# Patient Record
Sex: FEMALE | Race: BLACK OR AFRICAN AMERICAN | NOT HISPANIC OR LATINO | Employment: OTHER | URBAN - METROPOLITAN AREA
[De-identification: names, ages, dates, MRNs, and addresses within clinical notes are randomized per-mention and may not be internally consistent; named-entity substitution may affect disease eponyms.]

---

## 2022-02-14 ENCOUNTER — TELEPHONE (OUTPATIENT)
Dept: FAMILY MEDICINE | Facility: CLINIC | Age: 82
End: 2022-02-14
Payer: MEDICARE

## 2022-02-14 NOTE — TELEPHONE ENCOUNTER
----- Message from Eva Barber sent at 2/14/2022  9:13 AM CST -----  dr mathews needs pt to be called for appt..[phone] (home)

## 2022-02-14 NOTE — TELEPHONE ENCOUNTER
----- Message from Lisa Albright MA sent at 2/11/2022  2:10 PM CST -----    ----- Message -----  From: Eva Barber  Sent: 2/11/2022   2:08 PM CST  To: Christie Ibrahim Staff    pt needs call to schedule ..[phone] or [phone]

## 2022-02-17 ENCOUNTER — OFFICE VISIT (OUTPATIENT)
Dept: FAMILY MEDICINE | Facility: CLINIC | Age: 82
End: 2022-02-17
Payer: MEDICARE

## 2022-02-17 VITALS
SYSTOLIC BLOOD PRESSURE: 160 MMHG | OXYGEN SATURATION: 97 % | RESPIRATION RATE: 16 BRPM | HEART RATE: 63 BPM | DIASTOLIC BLOOD PRESSURE: 72 MMHG

## 2022-02-17 DIAGNOSIS — S70.911D: Primary | ICD-10-CM

## 2022-02-17 DIAGNOSIS — L08.9: Primary | ICD-10-CM

## 2022-02-17 DIAGNOSIS — Z79.2 LONG TERM (CURRENT) USE OF ANTIBIOTICS: ICD-10-CM

## 2022-02-17 PROCEDURE — 99213 OFFICE O/P EST LOW 20 MIN: CPT | Mod: S$GLB,,, | Performed by: INTERNAL MEDICINE

## 2022-02-17 PROCEDURE — 1160F RVW MEDS BY RX/DR IN RCRD: CPT | Mod: CPTII,S$GLB,, | Performed by: INTERNAL MEDICINE

## 2022-02-17 PROCEDURE — 3078F PR MOST RECENT DIASTOLIC BLOOD PRESSURE < 80 MM HG: ICD-10-PCS | Mod: CPTII,S$GLB,, | Performed by: INTERNAL MEDICINE

## 2022-02-17 PROCEDURE — 3078F DIAST BP <80 MM HG: CPT | Mod: CPTII,S$GLB,, | Performed by: INTERNAL MEDICINE

## 2022-02-17 PROCEDURE — 1159F MED LIST DOCD IN RCRD: CPT | Mod: CPTII,S$GLB,, | Performed by: INTERNAL MEDICINE

## 2022-02-17 PROCEDURE — 1160F PR REVIEW ALL MEDS BY PRESCRIBER/CLIN PHARMACIST DOCUMENTED: ICD-10-PCS | Mod: CPTII,S$GLB,, | Performed by: INTERNAL MEDICINE

## 2022-02-17 PROCEDURE — 3077F SYST BP >= 140 MM HG: CPT | Mod: CPTII,S$GLB,, | Performed by: INTERNAL MEDICINE

## 2022-02-17 PROCEDURE — 99213 PR OFFICE/OUTPT VISIT, EST, LEVL III, 20-29 MIN: ICD-10-PCS | Mod: S$GLB,,, | Performed by: INTERNAL MEDICINE

## 2022-02-17 PROCEDURE — 3077F PR MOST RECENT SYSTOLIC BLOOD PRESSURE >= 140 MM HG: ICD-10-PCS | Mod: CPTII,S$GLB,, | Performed by: INTERNAL MEDICINE

## 2022-02-17 PROCEDURE — 1159F PR MEDICATION LIST DOCUMENTED IN MEDICAL RECORD: ICD-10-PCS | Mod: CPTII,S$GLB,, | Performed by: INTERNAL MEDICINE

## 2022-02-17 RX ORDER — ATENOLOL 25 MG/1
25 TABLET ORAL
COMMUNITY

## 2022-02-17 RX ORDER — NAPROXEN SODIUM 220 MG/1
81 TABLET, FILM COATED ORAL
COMMUNITY

## 2022-02-17 NOTE — ASSESSMENT & PLAN NOTE
Doing well at this time, pain controlled.  Will continue current antibiotics.  Will get lab results from last week and request weekly labs be faxed here to [facility].  Will have her follow up in 3 weeks.  Continue orthopedics follow up as well.

## 2022-02-17 NOTE — PROGRESS NOTES
Subjective:       Patient ID: Henrietta Aguirre is a 81 y.o. female.    Chief Complaint: Follow-up (SEROMA OF HIP)    Patient is here for follow up on infected seroma of the right hip.  She reports she is doing well.  Her sister is with her.  She denies any pain from the hip currently.  Reports that drain was removed at orthopedic office today.  Drain output had been 40-50mL over each 12 hour period, was higher today.  Has a bandage in place.  No fever or chills.  PICC line is in the RUE, no issues with it.  Pending initiation of hyperbaric therapy.  Had labwork done earlier this week, we do not have results available at this time.  No other issues.    Review of Systems   Constitutional: Negative for chills and fever.   HENT: Negative for sore throat.    Respiratory: Negative for cough and shortness of breath.    Cardiovascular: Negative for chest pain and palpitations.   Gastrointestinal: Negative for diarrhea, nausea and vomiting.   Skin: Negative for rash and wound.       Objective:      Physical Exam  Vitals and nursing note reviewed.   Constitutional:       Appearance: She is well-developed and well-nourished.   Cardiovascular:      Rate and Rhythm: Normal rate and regular rhythm.      Heart sounds: No murmur heard.      Pulmonary:      Effort: Pulmonary effort is normal. No respiratory distress.      Breath sounds: Normal breath sounds. No wheezing.   Abdominal:      General: Bowel sounds are normal.      Palpations: Abdomen is soft.      Tenderness: There is no abdominal tenderness.   Skin:     General: Skin is warm and dry.      Findings: No rash.         Assessment:       1. Injury of hip, superficial, infected, right, subsequent encounter    2. Long term (current) use of antibiotics        Plan:       Problem List Items Addressed This Visit        ID    Long term (current) use of antibiotics     Tolerating well, continue to monitor.            Other    Injury of hip, superficial, infected, right, subsequent  encounter - Primary     Doing well at this time, pain controlled.  Will continue current antibiotics.  Will get lab results from last week and request weekly labs be faxed here to [facility].  Will have her follow up in 3 weeks.  Continue orthopedics follow up as well.

## 2022-03-10 ENCOUNTER — OFFICE VISIT (OUTPATIENT)
Dept: INFECTIOUS DISEASES | Facility: CLINIC | Age: 82
End: 2022-03-10
Payer: MEDICARE

## 2022-03-10 VITALS
DIASTOLIC BLOOD PRESSURE: 71 MMHG | HEART RATE: 80 BPM | SYSTOLIC BLOOD PRESSURE: 121 MMHG | OXYGEN SATURATION: 98 % | RESPIRATION RATE: 16 BRPM

## 2022-03-10 DIAGNOSIS — Z71.2 ENCOUNTER TO DISCUSS TEST RESULTS: ICD-10-CM

## 2022-03-10 DIAGNOSIS — S70.911D: Primary | ICD-10-CM

## 2022-03-10 DIAGNOSIS — Z95.828 STATUS POST PERIPHERALLY INSERTED CENTRAL CATHETER (PICC) CENTRAL LINE PLACEMENT: ICD-10-CM

## 2022-03-10 DIAGNOSIS — L08.9: Primary | ICD-10-CM

## 2022-03-10 PROCEDURE — 3074F PR MOST RECENT SYSTOLIC BLOOD PRESSURE < 130 MM HG: ICD-10-PCS | Mod: CPTII,S$GLB,, | Performed by: NURSE PRACTITIONER

## 2022-03-10 PROCEDURE — 3078F PR MOST RECENT DIASTOLIC BLOOD PRESSURE < 80 MM HG: ICD-10-PCS | Mod: CPTII,S$GLB,, | Performed by: NURSE PRACTITIONER

## 2022-03-10 PROCEDURE — 3074F SYST BP LT 130 MM HG: CPT | Mod: CPTII,S$GLB,, | Performed by: NURSE PRACTITIONER

## 2022-03-10 PROCEDURE — 99214 PR OFFICE/OUTPT VISIT, EST, LEVL IV, 30-39 MIN: ICD-10-PCS | Mod: S$GLB,,, | Performed by: NURSE PRACTITIONER

## 2022-03-10 PROCEDURE — 1159F PR MEDICATION LIST DOCUMENTED IN MEDICAL RECORD: ICD-10-PCS | Mod: CPTII,S$GLB,, | Performed by: NURSE PRACTITIONER

## 2022-03-10 PROCEDURE — 3078F DIAST BP <80 MM HG: CPT | Mod: CPTII,S$GLB,, | Performed by: NURSE PRACTITIONER

## 2022-03-10 PROCEDURE — 1159F MED LIST DOCD IN RCRD: CPT | Mod: CPTII,S$GLB,, | Performed by: NURSE PRACTITIONER

## 2022-03-10 PROCEDURE — 99214 OFFICE O/P EST MOD 30 MIN: CPT | Mod: S$GLB,,, | Performed by: NURSE PRACTITIONER

## 2022-03-10 NOTE — PROGRESS NOTES
Infectious Diseases Clinic Note    Subjective:       Patient ID: Henrietta Aguirre is a 81 y.o. female     Chief Complaint: Follow-up (3 week FU Injury of hip, infected, right) and Results        Henrietta Aguirre is a 81 y.o. female here today for consultation regarding hip infection.  This is a new diagnosis to me.   Primary care provider is Carmen Villalpando MD .     Ms. Aguirre is a 81 y.o. female with a history of mycosis fungoides, initially stage IB, which was diagnosed in 2003. When she initially presented, she had a 3-year history of a hypopigmented rash on the trunk, thighs, and arms, and had been on hydrochlorothiazide for 7 years prior to the diagnosis.     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 Marvin Ledesma's office on 2/3/2022 for right hip washout.     Patient is here for follow up on infected seroma of the right hip.  She reports she is doing well.  Her sister is with her.  She denies any pain from the hip currently.  No fever or chills.  PICC line is in the RUE, no issues with it. She is tolerating Ceftriaxone well. Last dose is scheduled for march 17th.  Pending initiation of hyperbaric therapy.  Had labwork done earlier this week. ESR is trending downward and is almost at goal.   No other issues.                   Past Medical History:   Diagnosis Date    Bronchitis     Citrobacter infection     HTN (hypertension)     Mycosis fungoides        Social History     Socioeconomic History    Marital status:    Tobacco Use    Smoking status: Never Smoker    Smokeless tobacco: Never Used   Substance and Sexual Activity    Alcohol use: Never    Drug use: Never         Current Outpatient Medications:     aspirin 81 MG Chew, Take 81 mg by  mouth., Disp: , Rfl:     atenoloL (TENORMIN) 25 MG tablet, Take 25 mg by mouth., Disp: , Rfl:     Review of Systems   Constitutional: Negative for chills and fever.   HENT: Negative for sore throat.    Respiratory: Negative for cough and shortness of breath.    Cardiovascular: Negative for chest pain and palpitations.   Gastrointestinal: Negative for diarrhea, nausea and vomiting.   Skin: Negative for rash and wound.           Objective:      Vitals:    03/10/22 1332   BP: 121/71   Pulse: 80   Resp: 16     Physical Exam  Vitals and nursing note reviewed.   Constitutional:       Appearance: She is well-developed.   Cardiovascular:      Rate and Rhythm: Normal rate and regular rhythm.      Heart sounds: No murmur heard.  Pulmonary:      Effort: Pulmonary effort is normal. No respiratory distress.      Breath sounds: Normal breath sounds. No wheezing.   Abdominal:      General: Bowel sounds are normal.      Palpations: Abdomen is soft.      Tenderness: There is no abdominal tenderness.   Musculoskeletal:        Arms:    Skin:     General: Skin is warm and dry.      Findings: No rash.   Neurological:      Mental Status: She is oriented to person, place, and time. Mental status is at baseline.   Psychiatric:         Mood and Affect: Mood normal.         Behavior: Behavior normal.         Thought Content: Thought content normal.         Judgment: Judgment normal.             Assessment/Plan:       1. Injury of hip, superficial, infected, right, subsequent encounter    2. Status post peripherally inserted central catheter (PICC) central line placement    3. Encounter to discuss test results      Problem List Items Addressed This Visit        Other    Injury of hip, superficial, infected, right, subsequent encounter - Primary    Current Assessment & Plan     Is doing well at this time.  She is scheduled to see Dr. Ledesma next week. Wound is doing really well at this point. She is tolerating Abx.     Labs reviewed w/ patient.      2/28/22   ESR 47     3/8/22  ESR 35       PLAN     1. Continue ABX as planned. Last dose is 3/17.   2. Scheduled to have labs next Monday. Anticipate ESR to be at goal. We will discuss this at her follow up next week.              Other Visit Diagnoses     Status post peripherally inserted central catheter (PICC) central line placement        site doing well.     Encounter to discuss test results             No visits with results within 1 Month(s) from this visit.   Latest known visit with results is:   No results found for any previous visit.      No results found in the last 30 days.      Duration of encounter: 20 minutes  This includes face-to-face time and non face-to-face time preparing to see the patient (eg, review of tests), obtaining and/or reviewing separately obtained history, documenting clinical information in the electronic or other health record, independently interpreting resultsand communicating results to the patient/family/caregiver, or care coordination.      All diagnostic data (labs/imaging) was reviewed with the patient and/or family member in the room.  All questions were answered to their liking. The patient and/or family member voiced understanding of all instructions provided. Expectations regarding follow up and treatment plan were voiced and confirmed prior to departure. The patient was given orders/instructions at the end of the visit for reference. They were instructed to notify my office if they have not been contacted for imaging/referrals/labs/results in 1-2 weeks. They voiced understanding of all of the above.     Follow up:     There are no Patient Instructions on file for this visit.     Follow up in about 1 week (around 3/17/2022), or if symptoms worsen or fail to improve.

## 2022-03-10 NOTE — ASSESSMENT & PLAN NOTE
Is doing well at this time.  She is scheduled to see Dr. Ledesma next week. Wound is doing really well at this point. She is tolerating Abx.     Labs reviewed w/ patient.     2/28/22   ESR 47     3/8/22  ESR 35       PLAN     1. Continue ABX as planned. Last dose is 3/17.   2. Scheduled to have labs next Monday. Anticipate ESR to be at goal. We will discuss this at her follow up next week.

## 2022-03-16 ENCOUNTER — TELEPHONE (OUTPATIENT)
Dept: FAMILY MEDICINE | Facility: CLINIC | Age: 82
End: 2022-03-16
Payer: MEDICARE

## 2022-03-16 NOTE — TELEPHONE ENCOUNTER
Patrice Soriano MD   Sent: Mon March 14, 2022 10:22 AM   To: Lisa Albright MA            Message    Labs look good, complete meds as scheduled.

## 2022-03-17 ENCOUNTER — OFFICE VISIT (OUTPATIENT)
Dept: INFECTIOUS DISEASES | Facility: CLINIC | Age: 82
End: 2022-03-17
Payer: MEDICARE

## 2022-03-17 ENCOUNTER — TELEPHONE (OUTPATIENT)
Dept: FAMILY MEDICINE | Facility: CLINIC | Age: 82
End: 2022-03-17
Payer: MEDICARE

## 2022-03-17 DIAGNOSIS — L08.9: ICD-10-CM

## 2022-03-17 DIAGNOSIS — S70.911D: ICD-10-CM

## 2022-03-17 PROCEDURE — 1159F PR MEDICATION LIST DOCUMENTED IN MEDICAL RECORD: ICD-10-PCS | Mod: CPTII,S$GLB,, | Performed by: NURSE PRACTITIONER

## 2022-03-17 PROCEDURE — 99213 OFFICE O/P EST LOW 20 MIN: CPT | Mod: S$GLB,,, | Performed by: NURSE PRACTITIONER

## 2022-03-17 PROCEDURE — 1159F MED LIST DOCD IN RCRD: CPT | Mod: CPTII,S$GLB,, | Performed by: NURSE PRACTITIONER

## 2022-03-17 PROCEDURE — 99213 PR OFFICE/OUTPT VISIT, EST, LEVL III, 20-29 MIN: ICD-10-PCS | Mod: S$GLB,,, | Performed by: NURSE PRACTITIONER

## 2022-03-17 NOTE — PROGRESS NOTES
Infectious Diseases Clinic Note    Subjective:       Patient ID: Henrietta Aguirre is a 81 y.o. female     Chief Complaint: Follow-up (PICC REMOVAL)        Henrietta Aguirre is a 81 y.o. female here today for consultation regarding hip infection.   Primary care provider is Carmen Villalpando MD .     Ms. Aguirre is a 81 y.o. female with a history of mycosis fungoides, initially stage IB, which was diagnosed in 2003. When she initially presented, she had a 3-year history of a hypopigmented rash on the trunk, thighs, and arms, and had been on hydrochlorothiazide for 7 years prior to the diagnosis.     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 Marvin Ledesma's office on 2/3/2022 for right hip washout.     Patient is here for 2 week follow up on infected seroma of the right hip.  She reports she is doing well.  Her sister is with her.  She denies any pain from the hip currently. Wound is healing well.  No fever or chills.  PICC line was removed this AM by [facility]. Last dose of ceftriaxone was this AM.   She is currently doing hyperbaric therapy.  Had labwork done earlier this week. ESR is trending downward and at goal of < 30. She still has sutures in place, which will be removed on Tuesday by Dr. Ledesma.   No other issues.                   Past Medical History:   Diagnosis Date    Bronchitis     Citrobacter infection     HTN (hypertension)     Mycosis fungoides        Social History     Socioeconomic History    Marital status:    Tobacco Use    Smoking status: Never Smoker    Smokeless tobacco: Never Used   Substance and Sexual Activity    Alcohol use: Never    Drug use: Never         Current Outpatient Medications:     aspirin 81 MG Chew, Take 81 mg by  mouth., Disp: , Rfl:     atenoloL (TENORMIN) 25 MG tablet, Take 25 mg by mouth., Disp: , Rfl:     Review of Systems   Constitutional: Negative for chills and fever.   HENT: Negative for sore throat.    Respiratory: Negative for cough and shortness of breath.    Cardiovascular: Negative for chest pain and palpitations.   Gastrointestinal: Negative for diarrhea, nausea and vomiting.   Skin: Negative for rash and wound.           Objective:      There were no vitals filed for this visit.  Physical Exam  Vitals and nursing note reviewed.   Constitutional:       General: She is not in acute distress.     Appearance: Normal appearance. She is well-developed. She is not ill-appearing.   Cardiovascular:      Rate and Rhythm: Normal rate and regular rhythm.      Heart sounds: No murmur heard.  Pulmonary:      Effort: Pulmonary effort is normal. No respiratory distress.      Breath sounds: Normal breath sounds. No wheezing.   Abdominal:      General: Bowel sounds are normal.      Palpations: Abdomen is soft.      Tenderness: There is no abdominal tenderness.   Skin:     General: Skin is warm and dry.      Findings: No rash.   Neurological:      Mental Status: She is alert and oriented to person, place, and time. Mental status is at baseline.   Psychiatric:         Mood and Affect: Mood normal.         Behavior: Behavior normal.         Thought Content: Thought content normal.         Judgment: Judgment normal.             Assessment/Plan:       1. Injury of hip, superficial, infected, right, subsequent encounter      Problem List Items Addressed This Visit        Other    Injury of hip, superficial, infected, right, subsequent encounter    Current Assessment & Plan        Is doing well at this time.  PICC line was removed by [facility] prior to arrival.      Labs reviewed w/ patient.      2/28/22   ESR 47      3/8/22  ESR 35     3/17/22  ESR 27         PLAN      ESR is at goal currently. She has completed 6 weeks of IV  ceftriaxone as planned. F/u PRN. No oral abx needed. F/u with Dr. Ledesma next week for suture removal.                      No visits with results within 1 Month(s) from this visit.   Latest known visit with results is:   No results found for any previous visit.      No results found in the last 30 days.      Duration of encounter: 15 minutes  This includes face-to-face time and non face-to-face time preparing to see the patient (eg, review of tests), obtaining and/or reviewing separately obtained history, documenting clinical information in the electronic or other health record, independently interpreting resultsand communicating results to the patient/family/caregiver, or care coordination.      All diagnostic data (labs/imaging) was reviewed with the patient and/or family member in the room.  All questions were answered to their liking. The patient and/or family member voiced understanding of all instructions provided. Expectations regarding follow up and treatment plan were voiced and confirmed prior to departure. The patient was given orders/instructions at the end of the visit for reference. They were instructed to notify my office if they have not been contacted for imaging/referrals/labs/results in 1-2 weeks. They voiced understanding of all of the above.     Follow up:     There are no Patient Instructions on file for this visit.     Follow up if symptoms worsen or fail to improve.

## 2022-03-17 NOTE — ASSESSMENT & PLAN NOTE
Is doing well at this time.  PICC line was removed by [facility] prior to arrival.      Labs reviewed w/ patient.      2/28/22   ESR 47      3/8/22  ESR 35     3/17/22  ESR 27         PLAN      ESR is at goal currently. She has completed 6 weeks of IV ceftriaxone as planned. F/u PRN. No oral abx needed. F/u with Dr. Ledesma next week for suture removal.

## 2022-03-17 NOTE — TELEPHONE ENCOUNTER
Esr = 27 per Rossana chen/ [facility].        Chase Landeros NP   Sent: Thu March 17, 2022 12:17 PM                Message    There is NO ESR on this report... need it asap

## 2023-04-25 ENCOUNTER — HOSPITAL ENCOUNTER (EMERGENCY)
Facility: HOSPITAL | Age: 83
Discharge: HOME | End: 2023-04-25
Payer: MEDICARE

## 2023-04-25 VITALS
WEIGHT: 180 LBS | OXYGEN SATURATION: 99 % | TEMPERATURE: 99 F | BODY MASS INDEX: 33.99 KG/M2 | RESPIRATION RATE: 15 BRPM | SYSTOLIC BLOOD PRESSURE: 180 MMHG | HEIGHT: 61 IN | HEART RATE: 71 BPM | DIASTOLIC BLOOD PRESSURE: 79 MMHG

## 2023-04-25 DIAGNOSIS — R42 LIGHTHEADEDNESS: ICD-10-CM

## 2023-04-25 DIAGNOSIS — R11.0 NAUSEA: Primary | ICD-10-CM

## 2023-04-25 LAB
ALBUMIN SERPL-MCNC: 4.7 G/DL (ref 3.4–5)
ALBUMIN/GLOB SERPL: 1.1 RATIO
ALP SERPL-CCNC: 120 UNIT/L (ref 50–144)
ALT SERPL-CCNC: 22 UNIT/L (ref 1–45)
ANION GAP SERPL CALC-SCNC: 7 MEQ/L (ref 2–13)
AST SERPL-CCNC: 31 UNIT/L (ref 14–36)
BASOPHILS # BLD AUTO: 0.07 X10(3)/MCL (ref 0.01–0.08)
BASOPHILS NFR BLD AUTO: 1.4 % (ref 0.1–1.2)
BILIRUBIN DIRECT+TOT PNL SERPL-MCNC: 1 MG/DL (ref 0–1)
BUN SERPL-MCNC: 22 MG/DL (ref 7–20)
CALCIUM SERPL-MCNC: 10.7 MG/DL (ref 8.4–10.2)
CHLORIDE SERPL-SCNC: 102 MMOL/L (ref 98–110)
CO2 SERPL-SCNC: 28 MMOL/L (ref 21–32)
CREAT SERPL-MCNC: 0.53 MG/DL (ref 0.66–1.25)
CREAT/UREA NIT SERPL: 42 (ref 12–20)
EOSINOPHIL # BLD AUTO: 0.12 X10(3)/MCL (ref 0.04–0.36)
EOSINOPHIL NFR BLD AUTO: 2.3 % (ref 0.7–7)
ERYTHROCYTE [DISTWIDTH] IN BLOOD BY AUTOMATED COUNT: 15.2 % (ref 11–14.5)
GFR SERPLBLD CREATININE-BSD FMLA CKD-EPI: >90 MLS/MIN/1.73/M2
GLOBULIN SER-MCNC: 4.4 GM/DL (ref 2–3.9)
GLUCOSE SERPL-MCNC: 142 MG/DL (ref 70–115)
HCT VFR BLD AUTO: 50 % (ref 36–48)
HGB BLD-MCNC: 16.3 G/DL (ref 11.8–16)
IMM GRANULOCYTES # BLD AUTO: 0.01 X10(3)/MCL (ref 0–0.03)
IMM GRANULOCYTES NFR BLD AUTO: 0.2 % (ref 0–0.5)
LIPASE SERPL-CCNC: <20 U/L (ref 23–300)
LYMPHOCYTES # BLD AUTO: 1.6 X10(3)/MCL (ref 1.16–3.74)
LYMPHOCYTES NFR BLD AUTO: 30.9 % (ref 20–55)
MCH RBC QN AUTO: 27.5 PG (ref 27–34)
MCV RBC AUTO: 84.3 FL (ref 79–99)
MEAN CELL HEMOGLOBIN CONCENTRATION (OHS) G/DL: 32.6 G/DL (ref 31–37)
MONOCYTES # BLD AUTO: 0.35 X10(3)/MCL (ref 0.24–0.36)
MONOCYTES NFR BLD AUTO: 6.8 % (ref 4.7–12.5)
NEUTROPHILS # BLD AUTO: 3.02 X10(3)/MCL (ref 1.56–6.13)
NEUTROPHILS NFR BLD AUTO: 58.4 % (ref 37–73)
NRBC BLD AUTO-RTO: 0 % (ref 0–1)
PLATELET # BLD AUTO: 169 X10(3)/MCL (ref 140–371)
PMV BLD AUTO: 10.9 FL (ref 9.4–12.4)
POCT GLUCOSE: 127 MG/DL (ref 70–110)
POTASSIUM SERPL-SCNC: 3.7 MMOL/L (ref 3.5–5.1)
PROT SERPL-MCNC: 9.1 GM/DL (ref 6.3–8.2)
RBC # BLD AUTO: 5.93 X10(6)/MCL (ref 4–5.1)
SODIUM SERPL-SCNC: 137 MMOL/L (ref 135–145)
TROPONIN I SERPL-MCNC: 0.01 NG/ML (ref 0–0.03)
WBC # SPEC AUTO: 5.2 X10(3)/MCL (ref 4–11.5)

## 2023-04-25 PROCEDURE — 93010 ELECTROCARDIOGRAM REPORT: CPT | Mod: ,,, | Performed by: INTERNAL MEDICINE

## 2023-04-25 PROCEDURE — 25000003 PHARM REV CODE 250

## 2023-04-25 PROCEDURE — 82962 GLUCOSE BLOOD TEST: CPT

## 2023-04-25 PROCEDURE — 96372 THER/PROPH/DIAG INJ SC/IM: CPT | Mod: 59

## 2023-04-25 PROCEDURE — 85025 COMPLETE CBC W/AUTO DIFF WBC: CPT

## 2023-04-25 PROCEDURE — 84484 ASSAY OF TROPONIN QUANT: CPT

## 2023-04-25 PROCEDURE — 99284 EMERGENCY DEPT VISIT MOD MDM: CPT | Mod: 25

## 2023-04-25 PROCEDURE — 93010 EKG 12-LEAD: ICD-10-PCS | Mod: ,,, | Performed by: INTERNAL MEDICINE

## 2023-04-25 PROCEDURE — 80053 COMPREHEN METABOLIC PANEL: CPT

## 2023-04-25 PROCEDURE — 83690 ASSAY OF LIPASE: CPT

## 2023-04-25 PROCEDURE — 96375 TX/PRO/DX INJ NEW DRUG ADDON: CPT

## 2023-04-25 PROCEDURE — 93005 ELECTROCARDIOGRAM TRACING: CPT

## 2023-04-25 PROCEDURE — 36415 COLL VENOUS BLD VENIPUNCTURE: CPT

## 2023-04-25 PROCEDURE — 96361 HYDRATE IV INFUSION ADD-ON: CPT

## 2023-04-25 PROCEDURE — 96374 THER/PROPH/DIAG INJ IV PUSH: CPT

## 2023-04-25 PROCEDURE — 63600175 PHARM REV CODE 636 W HCPCS

## 2023-04-25 RX ORDER — HYDROXYZINE 50 MG/ML
50 INJECTION, SOLUTION INTRAMUSCULAR ONCE
Status: COMPLETED | OUTPATIENT
Start: 2023-04-25 | End: 2023-04-25

## 2023-04-25 RX ORDER — ONDANSETRON 4 MG/1
4 TABLET, ORALLY DISINTEGRATING ORAL EVERY 8 HOURS PRN
Qty: 20 TABLET | Refills: 0 | Status: SHIPPED | OUTPATIENT
Start: 2023-04-25

## 2023-04-25 RX ORDER — PROCHLORPERAZINE EDISYLATE 5 MG/ML
5 INJECTION INTRAMUSCULAR; INTRAVENOUS
Status: COMPLETED | OUTPATIENT
Start: 2023-04-25 | End: 2023-04-25

## 2023-04-25 RX ORDER — HYDROXYZINE 50 MG/ML
50 INJECTION, SOLUTION INTRAMUSCULAR ONCE
Status: DISCONTINUED | OUTPATIENT
Start: 2023-04-25 | End: 2023-04-25

## 2023-04-25 RX ORDER — FAMOTIDINE 10 MG/ML
20 INJECTION INTRAVENOUS
Status: COMPLETED | OUTPATIENT
Start: 2023-04-25 | End: 2023-04-25

## 2023-04-25 RX ORDER — SODIUM CHLORIDE 9 MG/ML
1000 INJECTION, SOLUTION INTRAVENOUS
Status: COMPLETED | OUTPATIENT
Start: 2023-04-25 | End: 2023-04-25

## 2023-04-25 RX ADMIN — PROCHLORPERAZINE EDISYLATE 5 MG: 5 INJECTION INTRAMUSCULAR; INTRAVENOUS at 09:04

## 2023-04-25 RX ADMIN — FAMOTIDINE 20 MG: 10 INJECTION, SOLUTION INTRAVENOUS at 09:04

## 2023-04-25 RX ADMIN — SODIUM CHLORIDE 1000 ML: 9 INJECTION, SOLUTION INTRAVENOUS at 09:04

## 2023-04-25 RX ADMIN — HYDROXYZINE HYDROCHLORIDE 50 MG: 50 INJECTION, SOLUTION INTRAMUSCULAR at 09:04

## 2023-04-26 NOTE — ED PROVIDER NOTES
"Encounter Date: 4/25/2023       History     Chief Complaint   Patient presents with    Nausea     C/O NAUSEA SINCE THIS AFTERNOON NO VOMITING DENIES DIARRHEA. DIZZINESS     83-year-old female presents complaining of nausea since this afternoon.  The nausea is associated with some lightheadedness, and "feeling like I need to have diarrhea".  She denies any pain.  She has not vomited.  Denies any vertigo/spinning.  She does not recall any suspicious food, or any infectious exposures.    The history is provided by the patient.   Review of patient's allergies indicates:  No Known Allergies  Past Medical History:   Diagnosis Date    Bronchitis     Citrobacter infection     HTN (hypertension)     Mycosis fungoides      History reviewed. No pertinent surgical history.  Family History   Family history unknown: Yes     Social History     Tobacco Use    Smoking status: Never    Smokeless tobacco: Never   Substance Use Topics    Alcohol use: Never    Drug use: Never     Review of Systems   Gastrointestinal:  Positive for nausea. Negative for abdominal pain, diarrhea and vomiting.   Neurological:  Positive for light-headedness.   All other systems reviewed and are negative.    Physical Exam     Initial Vitals [04/25/23 2042]   BP Pulse Resp Temp SpO2   (!) 151/91 76 18 98.6 °F (37 °C) 100 %      MAP       --         Physical Exam    Nursing note and vitals reviewed.  Constitutional: Vital signs are normal. She appears well-developed and well-nourished. She is cooperative.   Elderly female in no apparent distress, appears a bit anxious   HENT:   Head: Normocephalic and atraumatic.   Right Ear: External ear normal.   Left Ear: External ear normal.   Mouth/Throat: Oropharynx is clear and moist.   Eyes: Conjunctivae, EOM and lids are normal. Pupils are equal, round, and reactive to light.   Neck: Trachea normal. Neck supple.   Normal range of motion.  Cardiovascular:  Normal rate, regular rhythm, normal heart sounds and intact " distal pulses.           Pulmonary/Chest: Breath sounds normal.   Abdominal: Abdomen is soft. Bowel sounds are normal. She exhibits no distension. There is no abdominal tenderness. There is no rebound and no guarding.   Musculoskeletal:         General: Normal range of motion.      Cervical back: Normal, normal range of motion and neck supple.      Lumbar back: Normal.     Neurological: She is alert and oriented to person, place, and time. She has normal strength. Coordination normal.   Skin: Skin is warm, dry and intact. Capillary refill takes less than 2 seconds.   Psychiatric: Her speech is normal and behavior is normal. Judgment and thought content normal. Cognition and memory are normal.   Anxious       ED Course   Procedures  Labs Reviewed   COMPREHENSIVE METABOLIC PANEL - Abnormal; Notable for the following components:       Result Value    Glucose Level 142 (*)     Blood Urea Nitrogen 22.0 (*)     Creatinine 0.53 (*)     Calcium Level Total 10.7 (*)     Protein Total 9.1 (*)     Globulin 4.4 (*)     BUN/Creatinine Ratio 42 (*)     All other components within normal limits   LIPASE - Abnormal; Notable for the following components:    Lipase Level <20 (*)     All other components within normal limits   CBC WITH DIFFERENTIAL - Abnormal; Notable for the following components:    RBC 5.93 (*)     Hgb 16.3 (*)     Hct 50.0 (*)     RDW 15.2 (*)     Basophil % 1.4 (*)     All other components within normal limits   POCT GLUCOSE - Abnormal; Notable for the following components:    POCT Glucose 127 (*)     All other components within normal limits   TROPONIN I - Normal   CBC W/ AUTO DIFFERENTIAL    Narrative:     The following orders were created for panel order CBC auto differential.  Procedure                               Abnormality         Status                     ---------                               -----------         ------                     CBC with Differential[[id number]]        Abnormal             Final result                 Please view results for these tests on the individual orders.        ECG Results              EKG 12-lead (Preliminary result)  Result time 04/25/23 21:00:51      Wet Read by Patrice Coats MD (04/25/23 21:00:51, [facility], Emergency Medicine)    Normal sinus rhythm, heart rate 65, slightly widened QRS, left axis, normal P waves, normal ST segments, normal T-waves                                  Imaging Results    None          Medications   prochlorperazine injection Soln 5 mg (5 mg Intravenous Given 4/25/23 2102)   famotidine (PF) injection 20 mg (20 mg Intravenous Given 4/25/23 2126)   0.9%  NaCl infusion (1,000 mLs Intravenous New Bag 4/25/23 2126)   hydrOXYzine injection 50 mg (50 mg Intramuscular Given 4/25/23 2108)     Medical Decision Making:   Initial Assessment:   Nausea, lightheadedness, anxiety  Differential Diagnosis:   Gastroenteritis, gastritis, unusual presentation for ACS, dehydration, electrolyte abnormality, UTI  ED Management:  Antiemetics, Pepcid, gentle IV hydration  Lab work  Patient feeling much better.  We will discharge home                        Clinical Impression:   Final diagnoses:  [R42] Lightheadedness  [R11.0] Nausea (Primary)        ED Disposition Condition    Discharge Good          ED Prescriptions       Medication Sig Dispense Start Date End Date Auth. Provider    ondansetron (ZOFRAN-ODT) 4 MG TbDL Take 1 tablet (4 mg total) by mouth every 8 (eight) hours as needed. 20 tablet 4/25/2023 -- Patrice Coats MD          Follow-up Information       Follow up With Specialties Details Why Contact Info    Fran Villalpando MD Internal Medicine Call in 1 day  [address] [facility] [address]  [phone]               Patrice Coats MD  04/25/23 7350

## 2023-04-26 NOTE — ED NOTES
Sudden onset dizziness and nausea while visiting a pt in hospital. Denies vomiting or diarrhea. Last ate fried chicken this afternoon.

## 2023-04-26 NOTE — ED NOTES
Reports already feeling better. Eyes closed. Resp e/u. In NAD. IVF infusing. PIV site intact. Will cont to monitor.